# Patient Record
(demographics unavailable — no encounter records)

---

## 2024-12-12 NOTE — DISCUSSION/SUMMARY
The office order for Diabetes registry removal request is Approved and finalized on March 8, 2023.     Thanks,  [facility] [employer] [de-identified] : I went over the pathophysiology of the patient's symptoms in great detail with the patient. He needs to avoid high-impact activities such as running and jumping or riding a treadmill. I recommend alternative activities such as riding a stationary bike or elliptical on low tension. He should focus on light weight and high repetition exercises. He should avoid squatting and kneeling. I informed the patient they are due for a repeat gel injection any time after 12/18/2024. Viscosupplementation was discussed as a solution to the patient's symptoms, and we are requesting Monovisc or Durolane for the left knee. Due to mild swelling at the injection of Synvisc we want to have Monovisc or Durolane instead.   All of their questions were answered. They understand and consent to the plan.   FU after authorization.

## 2024-12-12 NOTE — PHYSICAL EXAM
[de-identified] : Constitutional o Appearance : well-nourished, well developed, alert, in no acute distress  Head and Face o Head :  Inspection : atraumatic, normocephalic o Face :  Inspection : no visible rash or discoloration Respiratory o Respiratory Effort: breathing unlabored  Neurologic o Mental Status Examination :  Orientation : grossly oriented to person, place and time Psychiatric o Mood and Affect: mood normal, affect appropriate   Right Lower Extremity o Buttock : no tenderness, swelling or deformities  o Right Hip :  Inspection/Palpation : no tenderness, swelling or deformities  Range of Motion : full and painless in all planes, no crepitance  Stability : joint stability intact  Strength : extension, flexion, adduction, abduction, internal rotation and external rotation 5/5   o Right Knee :  Inspection/Palpation : no tenderness to palpation, no swelling  Range of Motion : active flexion and extension full and painless, no crepitance  Stability : no valgus or varus instability present on provocative testing  Strength : flexion and extension 5/5  Tests and Signs : Anterior Drawer negative, Lachman negative, Laura's negative  Left Lower Extremity o Buttock : no tenderness, swelling or deformities  o Left Hip :  Inspection/Palpation : no tenderness, no swelling or deformities  Range of Motion : full and painless in all planes, no crepitance  Stability : joint stability intact  Strength : extension, flexion, adduction, abduction, internal rotation and external rotation 4+/5  o Left Knee :  Inspection/Palpation : no medial facet tenderness mild posteromedial joint line tenderness palpation, no swelling, hyperthrophic change in the medial compartment  Range of Motion : full ROM 0-125 with discomfort on full flexion, no pain with extension, no crepitance, good patellofemoral glide  Stability : no valgus or varus instability present on provocative testing  Strength : flexion and extension 5/5  Tests and Signs : Anterior Drawer negative, Lachman negative, Laura's negative  Gait: heel/toe  steps, no significant extremity swelling or lymphedema

## 2024-12-12 NOTE — REVIEW OF SYSTEMS
[Negative] : Heme/Lymph
Patient requests all Lab, Cardiology, and Radiology Results on their Discharge Instructions

## 2024-12-12 NOTE — ADDENDUM
[FreeTextEntry1] : I, KYLEE CHERRY, acted solely as a scribe for Dr. Michele Tam on this date 12/12/2024.  All medical record entries made by the Scribe were at my, Dr. Michele Tam, direction and personally dictated by me on 12/12/2024. I have reviewed the chart and agree that the record accurately reflects my personal performance of the history, physical exam, assessment and plan. I have also personally directed, reviewed, and agreed with the chart.

## 2024-12-12 NOTE — HISTORY OF PRESENT ILLNESS
[de-identified] : 82 year old male presents for a follow-up evaluation of his left knee. He denies any swelling or buckling.  He notes last week he has been doing a lot of gardening. He has difficulty kneeling, squatting, and standing up from a seated position. He denies any swelling or buckling. He had a left knee Synvisc-one injection on 6/18/2024 and notes minimal improvement of his left knee. I informed the patient they are due for a repeat gel injection any time. He had a left knee cortisone injection on 4/30/2024 with 80% relief.  PMHx: 2 stents,   Radiology Results: 4/30/2024 Left Knee: Standing AP, lateral, tunnel and skyline views were obtained and reveal moderate medial and patellofemoral arthritis

## 2025-01-14 NOTE — ADDENDUM
[FreeTextEntry1] : I, KYLEE CHERRY, acted solely as a scribe for Dr. Michele Tam on this date 01/14/2025.  All medical record entries made by the Scribe were at my, Dr. Michele Tam, direction and personally dictated by me on 01/14/2025. I have reviewed the chart and agree that the record accurately reflects my personal performance of the history, physical exam, assessment and plan. I have also personally directed, reviewed, and agreed with the chart.

## 2025-01-14 NOTE — DISCUSSION/SUMMARY
[de-identified] : I went over the pathophysiology of the patient's symptoms in great detail with the patient. The patient elected to receive a Durolane injection into his left  knee today, and tolerated it well. I instructed the patient on ROM exercises, and told them to take it easy. The use of ice and rest was reviewed with the patient. The patient may resume activities tomorrow. I reminded the patient that it takes 4 to 6 weeks after the final injection to feel symptom relief.   All of their questions were answered. They understand and consent to the plan.   FU in 6 weeks

## 2025-01-14 NOTE — PHYSICAL EXAM
[de-identified] : Constitutional o Appearance : well-nourished, well developed, alert, in no acute distress  Head and Face o Head :  Inspection : atraumatic, normocephalic o Face :  Inspection : no visible rash or discoloration Respiratory o Respiratory Effort: breathing unlabored  Neurologic o Mental Status Examination :  Orientation : grossly oriented to person, place and time Psychiatric o Mood and Affect: mood normal, affect appropriate   Right Lower Extremity o Buttock : no tenderness, swelling or deformities  o Right Hip :  Inspection/Palpation : no tenderness, swelling or deformities  Range of Motion : full and painless in all planes, no crepitance  Stability : joint stability intact  Strength : extension, flexion, adduction, abduction, internal rotation and external rotation 5/5   o Right Knee :  Inspection/Palpation : no tenderness to palpation, no swelling  Range of Motion : active flexion and extension full and painless, no crepitance  Stability : no valgus or varus instability present on provocative testing  Strength : flexion and extension 5/5  Tests and Signs : Anterior Drawer negative, Lachman negative, Laura's negative  Left Lower Extremity o Buttock : no tenderness, swelling or deformities  o Left Hip :  Inspection/Palpation : no tenderness, no swelling or deformities  Range of Motion : full and painless in all planes, no crepitance  Stability : joint stability intact  Strength : extension, flexion, adduction, abduction, internal rotation and external rotation 4+/5  o Left Knee :  Inspection/Palpation : no medial facet tenderness mild posteromedial joint line tenderness palpation, no swelling, hyperthrophic change in the medial compartment  Range of Motion : full ROM 0-125 with discomfort on full flexion, no pain with extension, no crepitance, good patellofemoral glide  Stability : no valgus or varus instability present on provocative testing  Strength : flexion and extension 5/5  Tests and Signs : Anterior Drawer negative, Lachman negative, Laura's negative  Gait: heel/toe  steps, no significant extremity swelling or lymphedema  o Knee injection : Indication- knee osteoarthritis, Anatomic location- left intra-articular joint space, Spray - area was sterilized with Betadine and alcohol and anesthetized with Ethyl Chloride , needle used-20G, Medications given- 3cc's Durolane under Ultrasound guidance. The first picture did not save. The patient tolerated the procedure well.

## 2025-01-14 NOTE — HISTORY OF PRESENT ILLNESS
[de-identified] : 82 year old male presents for a left knee Durolane injection today 1/14/2025. He denies any swelling or buckling. He had a left knee cortisone injection on 4/30/2024 with 80% relief.  PMHx: 2 stents,   Radiology Results: 4/30/2024 Left Knee: Standing AP, lateral, tunnel and skyline views were obtained and reveal moderate medial and patellofemoral arthritis

## 2025-04-15 NOTE — HEALTH RISK ASSESSMENT
[Good] : ~his/her~  mood as  good [No] : In the past 12 months have you used drugs other than those required for medical reasons? No [No falls in past year] : Patient reported no falls in the past year [PHQ-2 Negative - No further assessment needed] : PHQ-2 Negative - No further assessment needed [None] : Patient does not have any barriers to medication adherence [Yes] : Reviewed medication list for presence of high-risk medications. [Benzodiazepines] : benzodiazepines [Opioids] : opioids [Blood Thinners] : blood thinners [Muscle Relaxants] : muscle relaxants [Sedatives] : sedatives [Never] : Never [NO] : No [With Family] : lives with family [Retired] : retired [] :  [Feels Safe at Home] : Feels safe at home [Fully functional (bathing, dressing, toileting, transferring, walking, feeding)] : Fully functional (bathing, dressing, toileting, transferring, walking, feeding) [Fully functional (using the telephone, shopping, preparing meals, housekeeping, doing laundry, using] : Fully functional and needs no help or supervision to perform IADLs (using the telephone, shopping, preparing meals, housekeeping, doing laundry, using transportation, managing medications and managing finances) [Smoke Detector] : smoke detector [Carbon Monoxide Detector] : carbon monoxide detector [Seat Belt] :  uses seat belt [Sunscreen] : uses sunscreen [With Patient/Caregiver] : , with patient/caregiver [Time Spent: ___ minutes] : Time Spent: [unfilled] minutes [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 Discussed with pt the need for a health care proxy. Discussed who can be a proxy, forms given if needed, and the need for the proxy to know their wishes and to make sure they will comply. The proxy will need to have a copy in their home and the patient should have a copy.

## 2025-04-15 NOTE — PHYSICAL EXAM
[No Acute Distress] : no acute distress [Well Nourished] : well nourished [Well Developed] : well developed [Well-Appearing] : well-appearing [Normal Sclera/Conjunctiva] : normal sclera/conjunctiva [PERRL] : pupils equal round and reactive to light [EOMI] : extraocular movements intact [Normal Outer Ear/Nose] : the outer ears and nose were normal in appearance [Normal Oropharynx] : the oropharynx was normal [Normal TMs] : both tympanic membranes were normal [No JVD] : no jugular venous distention [No Lymphadenopathy] : no lymphadenopathy [Supple] : supple [Thyroid Normal, No Nodules] : the thyroid was normal and there were no nodules present [No Respiratory Distress] : no respiratory distress  [No Accessory Muscle Use] : no accessory muscle use [Clear to Auscultation] : lungs were clear to auscultation bilaterally [Regular Rhythm] : with a regular rhythm [Normal S1, S2] : normal S1 and S2 [Bradycardia] : bradycardic [II] : a grade 2 [No Carotid Bruits] : no carotid bruits [No Abdominal Bruit] : a ~M bruit was not heard ~T in the abdomen [No Varicosities] : no varicosities [Pedal Pulses Present] : the pedal pulses are present [No Edema] : there was no peripheral edema [No Palpable Aorta] : no palpable aorta [No Extremity Clubbing/Cyanosis] : no extremity clubbing/cyanosis [Soft] : abdomen soft [Non Tender] : non-tender [Non-distended] : non-distended [No Masses] : no abdominal mass palpated [No HSM] : no HSM [Normal Bowel Sounds] : normal bowel sounds [No Hernias] : no hernias [Normal Sphincter Tone] : normal sphincter tone [No Mass] : no mass [Penis Abnormality] : normal circumcised penis [Testes Tenderness] : no tenderness of the testes [Testes Mass (___cm)] : there were no testicular masses [Prostate Tenderness] : the prostate was not tender [No Prostate Nodules] : no prostate nodules [Prostate Size ___ (0-4)] : prostate size [unfilled] (scale: 0-4) [Normal Supraclavicular Nodes] : no supraclavicular lymphadenopathy [Normal Axillary Nodes] : no axillary lymphadenopathy [Normal Posterior Cervical Nodes] : no posterior cervical lymphadenopathy [Normal Anterior Cervical Nodes] : no anterior cervical lymphadenopathy [Normal Inguinal Nodes] : no inguinal lymphadenopathy [Normal Femoral Nodes] : no femoral lymphadenopathy [No CVA Tenderness] : no CVA  tenderness [No Spinal Tenderness] : no spinal tenderness [No Joint Swelling] : no joint swelling [Grossly Normal Strength/Tone] : grossly normal strength/tone [No Rash] : no rash [Coordination Grossly Intact] : coordination grossly intact [No Focal Deficits] : no focal deficits [Normal Gait] : normal gait [Deep Tendon Reflexes (DTR)] : deep tendon reflexes were 2+ and symmetric [Normal Affect] : the affect was normal [Alert and Oriented x3] : oriented to person, place, and time [Normal Insight/Judgement] : insight and judgment were intact [de-identified] : NADINE

## 2025-04-15 NOTE — ASSESSMENT
[Vaccines Reviewed] : Immunizations reviewed today. Please see immunization details in the vaccine log within the immunization flowsheet.  [FreeTextEntry1] :  Pt for well exam. I have advised the patient should consider getting the following vaccinations: Tdap Labs reviewed and lipids ordered Health counseling given. To have stress test and echo sent here. Discussed different options for BPH - including TURP, laser therapy, PA embolization.

## 2025-06-03 NOTE — ADDENDUM
[FreeTextEntry1] : I, KYLEE CHERRY, acted solely as a scribe for Dr. Michele Tam on this date 06/03/2025.  All medical record entries made by the Scribe were at my, Dr. Michele Tam, direction and personally dictated by me on 06/03/2025. I have reviewed the chart and agree that the record accurately reflects my personal performance of the history, physical exam, assessment and plan. I have also personally directed, reviewed, and agreed with the chart.

## 2025-06-03 NOTE — PHYSICAL EXAM
[de-identified] : Constitutional o Appearance : well-nourished, well developed, alert, in no acute distress  Head and Face o Head :  Inspection : atraumatic, normocephalic o Face :  Inspection : no visible rash or discoloration Respiratory o Respiratory Effort: breathing unlabored  Neurologic o Mental Status Examination :  Orientation : grossly oriented to person, place and time Psychiatric o Mood and Affect: mood normal, affect appropriate   Right Lower Extremity o Buttock : no tenderness, swelling or deformities  o Right Hip :  Inspection/Palpation : no tenderness, swelling or deformities  Range of Motion : full and painless in all planes, no crepitance  Stability : joint stability intact  Strength : extension, flexion, adduction, abduction, internal rotation and external rotation 5/5   o Right Knee :  Inspection/Palpation : no tenderness to palpation, no swelling  Range of Motion : 0-125 active flexion and extension full and painless, no crepitance  Stability : no valgus or varus instability present on provocative testing  Strength : flexion and extension 5/5  Tests and Signs : Anterior Drawer negative, Lachman negative, Laura's negative  Left Lower Extremity o Buttock : no tenderness, swelling or deformities  o Left Hip :  Inspection/Palpation : no tenderness, no swelling or deformities  Range of Motion : full and painless in all planes, no crepitance  Stability : joint stability intact  Strength : extension, flexion, adduction, abduction, internal rotation and external rotation 5/5  o Left Knee :  Inspection/Palpation : no medial facet tenderness mild posteromedial joint line tenderness palpation, no swelling, hyperthrophic change in the medial compartment  Range of Motion : full ROM 0-125 with discomfort on full flexion, no pain with extension, no crepitance, good patellofemoral glide  Stability : no valgus or varus instability present on provocative testing  Strength : flexion and extension 5/5  Tests and Signs : Anterior Drawer negative, Lachman negative, Laura's negative  Gait: good core strength, no significant extremity swelling or lymphedema  Radiology Results 6/3/2025  o Left Knee : Standing AP, lateral and tunnel views of the knee were obtained and revealed moderate tricompartmental osteoarthritis worse in the lateral and patellofemoral compartment

## 2025-06-03 NOTE — HISTORY OF PRESENT ILLNESS
[de-identified] : 82 year old male presents for a left knee follow-up visit. He had a left knee Durolane injection on 1/14/2025 and reports dramatic relief. He has minimal complaints today.  He denies any swelling or buckling. He notes his right knee bothers him on rare occasions. He denies soreness to the touch. Patient denies taking any medication to manage his pain. He denies that the pain wakes him from sleep. He had a left knee cortisone injection on 4/30/2024 with 80% relief. PMHx: 2 stents,   Radiology Results: 4/30/2024 Left Knee: Standing AP, lateral, tunnel and skyline views were obtained and reveal moderate medial and patellofemoral arthritis

## 2025-06-03 NOTE — DISCUSSION/SUMMARY
[de-identified] : I went over the pathophysiology of the patient's symptoms in great detail with the patient. I discussed the underlying pathophysiology of the patient's condition in great detail with the patient. I went over the patient's x-rays with them in great detail. He needs to avoid high-impact activities such as running and jumping or riding a treadmill. I recommend alternative activities such as riding a stationary bike or elliptical on low tension. He should focus on light weight and high repetition exercises. He should avoid squatting and kneeling. Viscosupplementation was discussed as a solution to the patient's symptoms, and we are requesting Durolane for left knee.   All of their questions were answered. They understand and consent to the plan.   FU after 7/14/2025 for the left knee.

## 2025-07-15 NOTE — DISCUSSION/SUMMARY
[de-identified] : I went over the pathophysiology of the patient's symptoms in great detail with the patient. I discussed the underlying pathophysiology of the patient's condition in great detail with the patient. I went over the patient's x-rays with them in great detail. He needs to avoid high-impact activities such as running and jumping or riding a treadmill. I recommend alternative activities such as riding a stationary bike or elliptical on low tension. He should focus on light weight and high repetition exercises. He should avoid squatting and kneeling.   All of their questions were answered. They understand and consent to the plan.  FU in 6 weeks.

## 2025-07-15 NOTE — HISTORY OF PRESENT ILLNESS
[de-identified] : 82 year old male presents for a left knee follow-up visit. He had a left knee Durolane injection on 1/14/2025 and reports dramatic relief. He has minimal complaints today. He denies any swelling or buckling. He notes his right knee bothers him on rare occasions. He denies soreness to the touch. Patient denies taking any medication to manage his pain. He denies that the pain wakes him from sleep. He had a left knee cortisone injection on 4/30/2024 with 80% relief. PMHx: 2 stents,  Radiology Results 6/3/2025 o Left Knee : Standing AP, lateral and tunnel views of the knee were obtained and revealed moderate tricompartmental osteoarthritis worse in the lateral and patellofemoral compartment    Radiology Results: 4/30/2024 Left Knee: Standing AP, lateral, tunnel and skyline views were obtained and reveal moderate medial and patellofemoral arthritis

## 2025-07-15 NOTE — PHYSICAL EXAM
[de-identified] : Constitutional o Appearance : well-nourished, well developed, alert, in no acute distress Head and Face o Head : Inspection : atraumatic, normocephalic o Face : Inspection : no visible rash or discoloration Respiratory o Respiratory Effort: breathing unlabored Neurologic o Mental Status Examination : Orientation : grossly oriented to person, place and time Psychiatric o Mood and Affect: mood normal, affect appropriate  Right Lower Extremity o Buttock : no tenderness, swelling or deformities o Right Hip : Inspection/Palpation : no tenderness, swelling or deformities Range of Motion : full and painless in all planes, no crepitance Stability : joint stability intact Strength : extension, flexion, adduction, abduction, internal rotation and external rotation 5/5  o Right Knee : Inspection/Palpation : no tenderness to palpation, no swelling Range of Motion : 0-125 active flexion and extension full and painless, no crepitance Stability : no valgus or varus instability present on provocative testing Strength : flexion and extension 5/5 Tests and Signs : Anterior Drawer negative, Lachman negative, Laura's negative  Left Lower Extremity o Buttock : no tenderness, swelling or deformities o Left Hip : Inspection/Palpation : no tenderness, no swelling or deformities Range of Motion : full and painless in all planes, no crepitance Stability : joint stability intact Strength : extension, flexion, adduction, abduction, internal rotation and external rotation 5/5  o Left Knee : Inspection/Palpation : no medial facet tenderness mild posteromedial joint line tenderness palpation, no swelling, hyperthrophic change in the medial compartment Range of Motion : full ROM 0-125 with discomfort on full flexion, no pain with extension, no crepitance, good patellofemoral glide Stability : no valgus or varus instability present on provocative testing Strength : flexion and extension 5/5 Tests and Signs : Anterior Drawer negative, Lachman negative, Laura's negative  Gait: good core strength, no significant extremity swelling or lymphedema   o Knee injection : Indication- knee osteoarthritis, Anatomic location- left intra-articular joint space, Spray - area was sterilized with Betadine and alcohol and anesthetized with Ethyl Chloride , needle used-20G, Medications given- 3cc's Durolane under Ultrasound guidance. The patient tolerated the procedure well.